# Patient Record
Sex: FEMALE | Race: WHITE | NOT HISPANIC OR LATINO | URBAN - METROPOLITAN AREA
[De-identification: names, ages, dates, MRNs, and addresses within clinical notes are randomized per-mention and may not be internally consistent; named-entity substitution may affect disease eponyms.]

---

## 2017-03-28 NOTE — PATIENT DISCUSSION
Nonproliferative Diabetic Retinopathy Counseling:  I have discussed with the patient the importance of controlling blood glucose, blood pressure and lipid levels to minimize the risk of progressing retinal complications from diabetes. I explained the importance of annual dilated eye exams because effective treatment of retinal complications depends on timely intervention. The patient was instructed to call or return sooner if they noticed blurred or distorted vision, fluctuating vision, pain or redness around one or both eyes. Return for follow-up as scheduled.

## 2021-07-14 ENCOUNTER — NEW PATIENT PROBLEM FOCUSED (OUTPATIENT)
Dept: URBAN - METROPOLITAN AREA CLINIC 53 | Facility: CLINIC | Age: 51
End: 2021-07-14

## 2021-07-14 DIAGNOSIS — H04.123: ICD-10-CM

## 2021-07-14 PROCEDURE — 92004 COMPRE OPH EXAM NEW PT 1/>: CPT

## 2021-07-14 ASSESSMENT — VISUAL ACUITY
OD_SC: J1+
OU_SC: J1+
OS_SC: J1+
OS_SC: 20/400
OD_CC: 20/20
OD_SC: 20/400
OS_CC: 20/20

## 2021-07-14 ASSESSMENT — TONOMETRY
OD_IOP_MMHG: 24
OS_IOP_MMHG: 24
OS_IOP_MMHG: 21
OD_IOP_MMHG: 20

## 2021-07-14 NOTE — PATIENT DISCUSSION
IOP borderline today. (+)K spindle OU. Patient states she was monitored every 6 months by previous eye doctor for borderline IOP. Will request previous records. Will monitor in 6 months Annual Dilated exam. (last dilated exam, 6 months ago per patient).

## 2021-07-14 NOTE — PATIENT DISCUSSION
Healing. Discussed with patient option of warm compresses 10 minutes at a time. Patient to call office for possible excision/injection if not healed in a few weeks. Stop Prednisolone Acetate.

## 2021-08-02 NOTE — PATIENT DISCUSSION
Pre-Op 2nd Eye Counseling: The patient has noticed an improvement in their visual symptoms in the operative eye. The patient complains of decreased vision in the fellow eye when ___DRIVING AND READING___. It was explained to the patient that the decision to proceed with cataract surgery in the fellow eye is entirely a separate decision from the surgical eye. All of the same risks, benefits and alternatives are reviewed with the patient again. The patient does feel the vision in the non-operative eye is limiting their daily activities and elects to proceed with cataract surgery in the __RIGHT__ eye. . I have given the patient the prescribed regimen of  drops to use before and after cataract surgery. Patient to administer as directed.

## 2021-08-02 NOTE — PATIENT DISCUSSION
S/P PE IOL, _OS__. DOING WELL. CONTINUE PRED-GATI-BROM IN THE SURGICAL EYE  FOR A TOTAL OF 3 WEEKS USE THEN DISCONTINUE. PATIENT DESIRES __STANDARD__IOL FOR 2ND EYE. SCHEDULE CATARACT SURGERY.

## 2021-09-29 ENCOUNTER — PROBLEM (OUTPATIENT)
Dept: URBAN - METROPOLITAN AREA CLINIC 53 | Facility: CLINIC | Age: 51
End: 2021-09-29

## 2021-09-29 DIAGNOSIS — H00.15: ICD-10-CM

## 2021-09-29 PROCEDURE — 92012 INTRM OPH EXAM EST PATIENT: CPT

## 2021-09-29 ASSESSMENT — VISUAL ACUITY
OS_CC: 20/20
OD_CC: 20/20

## 2021-09-29 ASSESSMENT — TONOMETRY
OS_IOP_MMHG: 22
OD_IOP_MMHG: 22

## 2021-09-29 NOTE — PATIENT DISCUSSION
Stable. Discussed with patient option of warm compresses 10 minutes at a time. Recommend consult with MD for possible remove/injection. Recommended oral antibiotic but patient declines and states she is unable to take oral antibiotic due to recent history of C. Diff.

## 2021-10-21 ENCOUNTER — LID CONSULT (OUTPATIENT)
Dept: URBAN - METROPOLITAN AREA CLINIC 53 | Facility: CLINIC | Age: 51
End: 2021-10-21

## 2021-10-21 DIAGNOSIS — H00.15: ICD-10-CM

## 2021-10-21 PROCEDURE — 92012 INTRM OPH EXAM EST PATIENT: CPT

## 2021-10-21 PROCEDURE — 92285 EXTERNAL OCULAR PHOTOGRAPHY: CPT

## 2021-10-21 ASSESSMENT — VISUAL ACUITY
OS_CC: 20/20
OU_CC: 20/20
OD_CC: 20/20

## 2021-10-21 ASSESSMENT — TONOMETRY
OS_IOP_MMHG: 18
OD_IOP_MMHG: 20

## 2021-10-21 NOTE — PATIENT DISCUSSION
D/w patient secondary to patient not being able to have oral antibiotic we recommend incision and drainage of stye left lower lid. Patient agrees and wants to proceed. Will schedule next available. Patient denies any blood thinners at this time.

## 2021-10-25 ENCOUNTER — EXCISION (OUTPATIENT)
Dept: URBAN - METROPOLITAN AREA CLINIC 49 | Facility: CLINIC | Age: 51
End: 2021-10-25

## 2021-10-25 VITALS — HEIGHT: 55 IN | DIASTOLIC BLOOD PRESSURE: 85 MMHG | HEART RATE: 84 BPM | SYSTOLIC BLOOD PRESSURE: 115 MMHG

## 2021-10-25 DIAGNOSIS — H00.15: ICD-10-CM

## 2021-10-25 PROCEDURE — 67800 REMOVE EYELID LESION: CPT

## 2021-10-25 ASSESSMENT — TONOMETRY
OD_IOP_MMHG: 19
OS_IOP_MMHG: 19

## 2021-10-25 ASSESSMENT — VISUAL ACUITY
OS_CC: 20/20
OD_CC: 20/20

## 2021-10-25 NOTE — PROCEDURE NOTE: CLINICAL
PROCEDURE NOTE: Excision Chalazion, Single Left Lower Lid. Diagnosis: Chalazion. Prior to treatment, the risks/benefits/alternatives were discussed. The patient wished to proceed with procedure. Informed consent was obtained. Once satisfactory anesthesia was achieved, the chalazion was clamped prior to performing an uncomplicated excision and expression until all visible debris was removed. Bleeding was controlled and the clamp was removed. The eye was pressure patched with antibiotic ointment. Patient tolerated the procedure well with no complications. Williams Morillo

## 2021-10-29 ENCOUNTER — APPOINTMENT (RX ONLY)
Dept: URBAN - METROPOLITAN AREA CLINIC 58 | Facility: CLINIC | Age: 51
Setting detail: DERMATOLOGY
End: 2021-10-29

## 2021-10-29 DIAGNOSIS — L72.0 EPIDERMAL CYST: ICD-10-CM | Status: STABLE

## 2021-10-29 DIAGNOSIS — L82.1 OTHER SEBORRHEIC KERATOSIS: ICD-10-CM | Status: STABLE

## 2021-10-29 DIAGNOSIS — L81.4 OTHER MELANIN HYPERPIGMENTATION: ICD-10-CM | Status: STABLE

## 2021-10-29 DIAGNOSIS — D22 MELANOCYTIC NEVI: ICD-10-CM | Status: STABLE

## 2021-10-29 DIAGNOSIS — D18.0 HEMANGIOMA: ICD-10-CM | Status: STABLE

## 2021-10-29 PROBLEM — D18.01 HEMANGIOMA OF SKIN AND SUBCUTANEOUS TISSUE: Status: ACTIVE | Noted: 2021-10-29

## 2021-10-29 PROBLEM — D22.5 MELANOCYTIC NEVI OF TRUNK: Status: ACTIVE | Noted: 2021-10-29

## 2021-10-29 PROCEDURE — ? COUNSELING

## 2021-10-29 PROCEDURE — ? ADDITIONAL NOTES

## 2021-10-29 PROCEDURE — ? ACNE SURGERY COSMETIC

## 2021-10-29 PROCEDURE — ? TREATMENT REGIMEN

## 2021-10-29 PROCEDURE — 99203 OFFICE O/P NEW LOW 30 MIN: CPT

## 2021-10-29 ASSESSMENT — LOCATION DETAILED DESCRIPTION DERM
LOCATION DETAILED: PERIUMBILICAL SKIN
LOCATION DETAILED: LEFT MEDIAL EYEBROW
LOCATION DETAILED: EPIGASTRIC SKIN

## 2021-10-29 ASSESSMENT — LOCATION SIMPLE DESCRIPTION DERM
LOCATION SIMPLE: LEFT EYEBROW
LOCATION SIMPLE: ABDOMEN

## 2021-10-29 ASSESSMENT — LOCATION ZONE DERM
LOCATION ZONE: FACE
LOCATION ZONE: TRUNK

## 2021-10-29 NOTE — PROCEDURE: ACNE SURGERY COSMETIC
Render Post-Care Instructions In Note?: no
Detail Level: Detailed
Post-Care Instructions: I reviewed with the patient in detail post-care instructions. Patient is to keep the treatment areas dry overnight, and then apply bacitracin twice daily until healed. Patient may apply hydrogen peroxide soaks to remove any crusting.
Acne Type: Comedonal Lesions
Consent was obtained and risks were reviewed including but not limited to scarring, infection, bleeding, scabbing, incomplete removal, and allergy to anesthesia.
Extraction Method: 11 blade and q-tip
Price (Use Numbers Only, No Special Characters Or $): 25
Prep Text (Optional): Prior to removal the treatment areas were prepped in the usual fashion.

## 2021-11-03 ENCOUNTER — 1 WEEK FOLLOW-UP (OUTPATIENT)
Dept: URBAN - METROPOLITAN AREA CLINIC 53 | Facility: CLINIC | Age: 51
End: 2021-11-03

## 2021-11-03 DIAGNOSIS — Z98.890: ICD-10-CM

## 2021-11-03 PROCEDURE — 99024 POSTOP FOLLOW-UP VISIT: CPT

## 2021-11-03 ASSESSMENT — VISUAL ACUITY
OD_CC: 20/20
OS_CC: 20/20

## 2021-11-03 ASSESSMENT — TONOMETRY
OS_IOP_MMHG: 19
OD_IOP_MMHG: 19

## 2022-02-09 ENCOUNTER — COMPREHENSIVE EXAM (OUTPATIENT)
Dept: URBAN - METROPOLITAN AREA CLINIC 53 | Facility: CLINIC | Age: 52
End: 2022-02-09

## 2022-02-09 DIAGNOSIS — Z97.3: ICD-10-CM

## 2022-02-09 DIAGNOSIS — H40.053: ICD-10-CM

## 2022-02-09 DIAGNOSIS — H25.13: ICD-10-CM

## 2022-02-09 DIAGNOSIS — H04.123: ICD-10-CM

## 2022-02-09 PROCEDURE — 92015 DETERMINE REFRACTIVE STATE: CPT

## 2022-02-09 PROCEDURE — 92014 COMPRE OPH EXAM EST PT 1/>: CPT

## 2022-02-09 PROCEDURE — 92310-0E ESTABLISHED CL PATIENT NO ADJUSTMENT

## 2022-02-09 ASSESSMENT — VISUAL ACUITY
OD_CC: 20/20
OS_CC: 20/20

## 2022-02-09 ASSESSMENT — TONOMETRY
OS_IOP_MMHG: 22
OD_IOP_MMHG: 22

## 2022-02-09 NOTE — PATIENT DISCUSSION
IOP borderline today. (+)K spindle OU. Patient states she was monitored every 6 months by previous eye doctor for borderline IOP. Robyn Mower RTC at patient convenience for HVF/OCT RNFL. RTC in 6 months for IOP check with [facility].

## 2022-02-09 NOTE — PATIENT DISCUSSION
Contact lens Rx given - no change. Patient is happy with current CL and is requesting same Rx to be finalized.

## 2022-06-29 ENCOUNTER — APPOINTMENT (OUTPATIENT)
Dept: CT IMAGING | Facility: HOSPITAL | Age: 52
End: 2022-06-29

## 2022-06-29 ENCOUNTER — HOSPITAL ENCOUNTER (EMERGENCY)
Facility: HOSPITAL | Age: 52
Discharge: HOME | End: 2022-06-29
Admitting: EMERGENCY MEDICINE

## 2022-06-29 VITALS
RESPIRATION RATE: 18 BRPM | TEMPERATURE: 98.2 F | BODY MASS INDEX: 21.97 KG/M2 | OXYGEN SATURATION: 100 % | HEART RATE: 85 BPM | DIASTOLIC BLOOD PRESSURE: 74 MMHG | WEIGHT: 124 LBS | HEIGHT: 63 IN | SYSTOLIC BLOOD PRESSURE: 110 MMHG

## 2022-06-29 DIAGNOSIS — K57.92 DIVERTICULITIS: Primary | ICD-10-CM

## 2022-06-29 LAB
ALBUMIN SERPL-MCNC: 4.4 G/DL (ref 3.5–5.2)
ALBUMIN/GLOB SERPL: 1.7 G/DL
ALP SERPL-CCNC: 72 U/L (ref 39–117)
ALT SERPL W P-5'-P-CCNC: 17 U/L (ref 1–33)
ANION GAP SERPL CALCULATED.3IONS-SCNC: 11 MMOL/L (ref 5–15)
AST SERPL-CCNC: 22 U/L (ref 1–32)
BASOPHILS # BLD AUTO: 0.02 10*3/MM3 (ref 0–0.2)
BASOPHILS NFR BLD AUTO: 0.3 % (ref 0–1.5)
BILIRUB SERPL-MCNC: 0.8 MG/DL (ref 0–1.2)
BILIRUB UR QL STRIP: NEGATIVE
BUN SERPL-MCNC: 7 MG/DL (ref 6–20)
BUN/CREAT SERPL: 13.7 (ref 7–25)
CALCIUM SPEC-SCNC: 9.4 MG/DL (ref 8.6–10.5)
CHLORIDE SERPL-SCNC: 104 MMOL/L (ref 98–107)
CLARITY UR: CLEAR
CO2 SERPL-SCNC: 25 MMOL/L (ref 22–29)
COLOR UR: YELLOW
CREAT SERPL-MCNC: 0.51 MG/DL (ref 0.57–1)
D-LACTATE SERPL-SCNC: 0.8 MMOL/L (ref 0.5–2)
DEPRECATED RDW RBC AUTO: 41.1 FL (ref 37–54)
EGFRCR SERPLBLD CKD-EPI 2021: 111.1 ML/MIN/1.73
EOSINOPHIL # BLD AUTO: 0.03 10*3/MM3 (ref 0–0.4)
EOSINOPHIL NFR BLD AUTO: 0.4 % (ref 0.3–6.2)
ERYTHROCYTE [DISTWIDTH] IN BLOOD BY AUTOMATED COUNT: 12.5 % (ref 12.3–15.4)
GLOBULIN UR ELPH-MCNC: 2.6 GM/DL
GLUCOSE SERPL-MCNC: 111 MG/DL (ref 65–99)
GLUCOSE UR STRIP-MCNC: NEGATIVE MG/DL
HCT VFR BLD AUTO: 43.5 % (ref 34–46.6)
HGB BLD-MCNC: 14.9 G/DL (ref 12–15.9)
HGB UR QL STRIP.AUTO: NEGATIVE
HOLD SPECIMEN: NORMAL
IMM GRANULOCYTES # BLD AUTO: 0.03 10*3/MM3 (ref 0–0.05)
IMM GRANULOCYTES NFR BLD AUTO: 0.4 % (ref 0–0.5)
KETONES UR QL STRIP: ABNORMAL
LEUKOCYTE ESTERASE UR QL STRIP.AUTO: NEGATIVE
LIPASE SERPL-CCNC: 31 U/L (ref 13–60)
LYMPHOCYTES # BLD AUTO: 1.24 10*3/MM3 (ref 0.7–3.1)
LYMPHOCYTES NFR BLD AUTO: 15.5 % (ref 19.6–45.3)
MAGNESIUM SERPL-MCNC: 2.2 MG/DL (ref 1.6–2.6)
MCH RBC QN AUTO: 30.7 PG (ref 26.6–33)
MCHC RBC AUTO-ENTMCNC: 34.3 G/DL (ref 31.5–35.7)
MCV RBC AUTO: 89.7 FL (ref 79–97)
MONOCYTES # BLD AUTO: 0.62 10*3/MM3 (ref 0.1–0.9)
MONOCYTES NFR BLD AUTO: 7.8 % (ref 5–12)
NEUTROPHILS NFR BLD AUTO: 6.05 10*3/MM3 (ref 1.7–7)
NEUTROPHILS NFR BLD AUTO: 75.6 % (ref 42.7–76)
NITRITE UR QL STRIP: NEGATIVE
NRBC BLD AUTO-RTO: 0 /100 WBC (ref 0–0.2)
PH UR STRIP.AUTO: 6.5 [PH] (ref 5–8)
PLATELET # BLD AUTO: 131 10*3/MM3 (ref 140–450)
PMV BLD AUTO: 12.1 FL (ref 6–12)
POTASSIUM SERPL-SCNC: 3.7 MMOL/L (ref 3.5–5.2)
PROCALCITONIN SERPL-MCNC: 0.04 NG/ML (ref 0–0.25)
PROT SERPL-MCNC: 7 G/DL (ref 6–8.5)
PROT UR QL STRIP: NEGATIVE
RBC # BLD AUTO: 4.85 10*6/MM3 (ref 3.77–5.28)
SARS-COV-2 RDRP RESP QL NAA+PROBE: NORMAL
SODIUM SERPL-SCNC: 140 MMOL/L (ref 136–145)
SP GR UR STRIP: 1.01 (ref 1–1.03)
UROBILINOGEN UR QL STRIP: ABNORMAL
WBC NRBC COR # BLD: 7.99 10*3/MM3 (ref 3.4–10.8)
WHOLE BLOOD HOLD COAG: NORMAL
WHOLE BLOOD HOLD SPECIMEN: NORMAL

## 2022-06-29 PROCEDURE — 87635 SARS-COV-2 COVID-19 AMP PRB: CPT | Performed by: STUDENT IN AN ORGANIZED HEALTH CARE EDUCATION/TRAINING PROGRAM

## 2022-06-29 PROCEDURE — 83735 ASSAY OF MAGNESIUM: CPT | Performed by: PHYSICIAN ASSISTANT

## 2022-06-29 PROCEDURE — 74177 CT ABD & PELVIS W/CONTRAST: CPT

## 2022-06-29 PROCEDURE — 81003 URINALYSIS AUTO W/O SCOPE: CPT | Performed by: PHYSICIAN ASSISTANT

## 2022-06-29 PROCEDURE — 25010000002 IOPAMIDOL 61 % SOLUTION: Performed by: PHYSICIAN ASSISTANT

## 2022-06-29 PROCEDURE — 99283 EMERGENCY DEPT VISIT LOW MDM: CPT

## 2022-06-29 PROCEDURE — 96372 THER/PROPH/DIAG INJ SC/IM: CPT

## 2022-06-29 PROCEDURE — 83605 ASSAY OF LACTIC ACID: CPT | Performed by: PHYSICIAN ASSISTANT

## 2022-06-29 PROCEDURE — 25010000002 DICYCLOMINE PER 20 MG: Performed by: PHYSICIAN ASSISTANT

## 2022-06-29 PROCEDURE — 84145 PROCALCITONIN (PCT): CPT | Performed by: PHYSICIAN ASSISTANT

## 2022-06-29 PROCEDURE — 83690 ASSAY OF LIPASE: CPT | Performed by: PHYSICIAN ASSISTANT

## 2022-06-29 PROCEDURE — 85025 COMPLETE CBC W/AUTO DIFF WBC: CPT | Performed by: PHYSICIAN ASSISTANT

## 2022-06-29 PROCEDURE — 80053 COMPREHEN METABOLIC PANEL: CPT | Performed by: PHYSICIAN ASSISTANT

## 2022-06-29 RX ORDER — ONDANSETRON 4 MG/1
4 TABLET, ORALLY DISINTEGRATING ORAL EVERY 6 HOURS PRN
Qty: 12 TABLET | Refills: 0 | Status: SHIPPED | OUTPATIENT
Start: 2022-06-29

## 2022-06-29 RX ORDER — AMOXICILLIN AND CLAVULANATE POTASSIUM 875; 125 MG/1; MG/1
1 TABLET, FILM COATED ORAL ONCE
Status: COMPLETED | OUTPATIENT
Start: 2022-06-29 | End: 2022-06-29

## 2022-06-29 RX ORDER — AMOXICILLIN AND CLAVULANATE POTASSIUM 875; 125 MG/1; MG/1
1 TABLET, FILM COATED ORAL 2 TIMES DAILY
Qty: 20 TABLET | Refills: 0 | Status: SHIPPED | OUTPATIENT
Start: 2022-06-29 | End: 2022-07-09

## 2022-06-29 RX ORDER — SODIUM CHLORIDE 0.9 % (FLUSH) 0.9 %
10 SYRINGE (ML) INJECTION AS NEEDED
Status: DISCONTINUED | OUTPATIENT
Start: 2022-06-29 | End: 2022-06-29 | Stop reason: HOSPADM

## 2022-06-29 RX ORDER — DICYCLOMINE HYDROCHLORIDE 10 MG/ML
20 INJECTION INTRAMUSCULAR ONCE
Status: COMPLETED | OUTPATIENT
Start: 2022-06-29 | End: 2022-06-29

## 2022-06-29 RX ADMIN — DICYCLOMINE HYDROCHLORIDE 20 MG: 20 INJECTION, SOLUTION INTRAMUSCULAR at 17:56

## 2022-06-29 RX ADMIN — SODIUM CHLORIDE, POTASSIUM CHLORIDE, SODIUM LACTATE AND CALCIUM CHLORIDE 1000 ML: 600; 310; 30; 20 INJECTION, SOLUTION INTRAVENOUS at 16:24

## 2022-06-29 RX ADMIN — AMOXICILLIN AND CLAVULANATE POTASSIUM 1 TABLET: 875; 125 TABLET, FILM COATED ORAL at 17:57

## 2022-06-29 RX ADMIN — IOPAMIDOL 100 ML: 612 INJECTION, SOLUTION INTRAVENOUS at 16:45

## 2022-08-10 ENCOUNTER — FOLLOW UP (OUTPATIENT)
Dept: URBAN - METROPOLITAN AREA CLINIC 53 | Facility: CLINIC | Age: 52
End: 2022-08-10

## 2022-08-10 DIAGNOSIS — H40.053: ICD-10-CM

## 2022-08-10 PROCEDURE — 92012 INTRM OPH EXAM EST PATIENT: CPT

## 2022-08-10 PROCEDURE — 92083 EXTENDED VISUAL FIELD XM: CPT

## 2022-08-10 PROCEDURE — 76514 ECHO EXAM OF EYE THICKNESS: CPT

## 2022-08-10 PROCEDURE — 92133 CPTRZD OPH DX IMG PST SGM ON: CPT

## 2022-08-10 ASSESSMENT — PACHYMETRY
OS_CT_UM: 537
OD_CT_UM: 576

## 2022-08-10 ASSESSMENT — VISUAL ACUITY
OS_CC: 20/20
OD_CC: 20/20

## 2022-08-10 ASSESSMENT — TONOMETRY
OS_IOP_MMHG: 22
OD_IOP_MMHG: 20
OD_IOP_MMHG: 22
OS_IOP_MMHG: 23

## 2022-08-10 NOTE — PATIENT DISCUSSION
IOP borderline today, 22/22 OU. (+)K spindle OU. Patient states she was monitored every 6 months by previous eye doctor for borderline IOP.  Patient states IOP has been the same since she was a teenager. HVF/OCT RNFL done 8/22 reviewed with patient, WNL and non-glaucomatous OU.  RTC in 6 months for routine exam.

## 2023-08-07 ENCOUNTER — FOLLOW UP (OUTPATIENT)
Dept: URBAN - METROPOLITAN AREA CLINIC 53 | Facility: CLINIC | Age: 53
End: 2023-08-07

## 2023-08-07 DIAGNOSIS — H40.053: ICD-10-CM

## 2023-08-07 PROCEDURE — 92083 EXTENDED VISUAL FIELD XM: CPT

## 2023-08-07 PROCEDURE — 92133 CPTRZD OPH DX IMG PST SGM ON: CPT

## 2023-08-07 PROCEDURE — 92012 INTRM OPH EXAM EST PATIENT: CPT

## 2023-08-07 ASSESSMENT — KERATOMETRY
OS_AXISANGLE_DEGREES: 062
OD_K1POWER_DIOPTERS: 44.75
OD_AXISANGLE_DEGREES: 109
OS_K1POWER_DIOPTERS: 45.00
OD_AXISANGLE2_DEGREES: 19
OD_K2POWER_DIOPTERS: 45.50
OS_AXISANGLE2_DEGREES: 152
OS_K2POWER_DIOPTERS: 45.50

## 2023-08-07 ASSESSMENT — TONOMETRY
OD_IOP_MMHG: 20
OD_IOP_MMHG: 18
OS_IOP_MMHG: 20
OS_IOP_MMHG: 21

## 2023-08-07 ASSESSMENT — VISUAL ACUITY
OS_CC: 20/20
OD_CC: 20/20
OU_CC: 20/20+2

## 2024-03-01 ENCOUNTER — COMPREHENSIVE EXAM (OUTPATIENT)
Dept: URBAN - METROPOLITAN AREA CLINIC 53 | Facility: CLINIC | Age: 54
End: 2024-03-01

## 2024-03-01 DIAGNOSIS — H52.4: ICD-10-CM

## 2024-03-01 DIAGNOSIS — Z97.3: ICD-10-CM

## 2024-03-01 DIAGNOSIS — H04.123: ICD-10-CM

## 2024-03-01 DIAGNOSIS — H25.13: ICD-10-CM

## 2024-03-01 DIAGNOSIS — H40.053: ICD-10-CM

## 2024-03-01 PROCEDURE — 99214 OFFICE O/P EST MOD 30 MIN: CPT

## 2024-03-01 PROCEDURE — 92310-1E ESTABLISHED CL PATIENT SPHERICAL SINGLE VISION SOFT LENS EVALUATION

## 2024-03-01 PROCEDURE — 92015 DETERMINE REFRACTIVE STATE: CPT

## 2024-03-01 ASSESSMENT — VISUAL ACUITY
OS_CC: 20/20
OS_GLARE: 20/25
OU_CC: 20/20
OD_CC: 20/20
OD_GLARE: 20/20
OU_SC: J1+
OD_GLARE: 20/20
OS_GLARE: 20/20
OS_CC: 20/20
OU_CC: 20/20
OD_CC: 20/20-1

## 2024-03-01 ASSESSMENT — TONOMETRY
OD_IOP_MMHG: 19
OS_IOP_MMHG: 21
OS_IOP_MMHG: 22
OD_IOP_MMHG: 17

## 2024-03-01 ASSESSMENT — KERATOMETRY
OS_K1POWER_DIOPTERS: 44.75
OS_AXISANGLE2_DEGREES: 163
OD_K2POWER_DIOPTERS: 45.25
OD_AXISANGLE2_DEGREES: 19
OS_K2POWER_DIOPTERS: 45.50
OS_AXISANGLE_DEGREES: 73
OD_K1POWER_DIOPTERS: 44.75
OD_AXISANGLE_DEGREES: 109

## 2024-10-09 NOTE — PATIENT DISCUSSION
Glasses Prescribed: Hey team it looks like the genetic testing from the left thyroid FNA came back as concern for medullary thyroid ca 95% risk.  She may need to come off Cellcept but ill leave that up to you Dioni, but it would be my preference to hold it at least.   Plan will be for total thyroidectomy and central neck dissection. She will need medical clearance prior to surgery in the next 4 weeks

## 2024-10-22 ENCOUNTER — EMERGENCY VISIT (OUTPATIENT)
Dept: URBAN - METROPOLITAN AREA CLINIC 53 | Facility: CLINIC | Age: 54
End: 2024-10-22

## 2024-10-22 DIAGNOSIS — H40.1334: ICD-10-CM

## 2024-10-22 DIAGNOSIS — H11.32: ICD-10-CM

## 2024-10-22 PROCEDURE — 99213 OFFICE O/P EST LOW 20 MIN: CPT

## 2024-10-22 PROCEDURE — 92020 GONIOSCOPY: CPT

## 2025-02-17 ENCOUNTER — APPOINTMENT (OUTPATIENT)
Age: 55
Setting detail: DERMATOLOGY
End: 2025-02-17

## 2025-02-17 DIAGNOSIS — D18.0 HEMANGIOMA: ICD-10-CM | Status: STABLE

## 2025-02-17 DIAGNOSIS — L81.4 OTHER MELANIN HYPERPIGMENTATION: ICD-10-CM | Status: STABLE

## 2025-02-17 DIAGNOSIS — L82.1 OTHER SEBORRHEIC KERATOSIS: ICD-10-CM | Status: STABLE

## 2025-02-17 DIAGNOSIS — D22 MELANOCYTIC NEVI: ICD-10-CM | Status: STABLE

## 2025-02-17 PROBLEM — D18.01 HEMANGIOMA OF SKIN AND SUBCUTANEOUS TISSUE: Status: ACTIVE | Noted: 2025-02-17

## 2025-02-17 PROBLEM — D22.5 MELANOCYTIC NEVI OF TRUNK: Status: ACTIVE | Noted: 2025-02-17

## 2025-02-17 PROCEDURE — 99203 OFFICE O/P NEW LOW 30 MIN: CPT

## 2025-02-17 PROCEDURE — ? TREATMENT REGIMEN

## 2025-02-17 PROCEDURE — ? COUNSELING

## 2025-02-17 ASSESSMENT — LOCATION DETAILED DESCRIPTION DERM
LOCATION DETAILED: EPIGASTRIC SKIN
LOCATION DETAILED: PERIUMBILICAL SKIN

## 2025-02-17 ASSESSMENT — LOCATION ZONE DERM: LOCATION ZONE: TRUNK

## 2025-02-17 ASSESSMENT — LOCATION SIMPLE DESCRIPTION DERM: LOCATION SIMPLE: ABDOMEN

## 2025-03-04 ENCOUNTER — COMPREHENSIVE EXAM (OUTPATIENT)
Age: 55
End: 2025-03-04

## 2025-03-04 DIAGNOSIS — H40.1334: ICD-10-CM

## 2025-03-04 DIAGNOSIS — H04.123: ICD-10-CM

## 2025-03-04 DIAGNOSIS — H25.13: ICD-10-CM

## 2025-03-04 DIAGNOSIS — H52.4: ICD-10-CM

## 2025-03-04 DIAGNOSIS — Z97.3: ICD-10-CM

## 2025-03-04 PROCEDURE — 99214 OFFICE O/P EST MOD 30 MIN: CPT

## 2025-03-04 PROCEDURE — 92310-1 LEVEL 1 SOFT LENS UPDATE

## 2025-03-04 PROCEDURE — 92015 DETERMINE REFRACTIVE STATE: CPT
